# Patient Record
Sex: MALE | Race: WHITE | URBAN - METROPOLITAN AREA
[De-identification: names, ages, dates, MRNs, and addresses within clinical notes are randomized per-mention and may not be internally consistent; named-entity substitution may affect disease eponyms.]

---

## 2018-03-27 ENCOUNTER — OFFICE VISIT (OUTPATIENT)
Dept: FAMILY MEDICINE CLINIC | Age: 63
End: 2018-03-27

## 2018-03-27 VITALS
DIASTOLIC BLOOD PRESSURE: 71 MMHG | OXYGEN SATURATION: 98 % | RESPIRATION RATE: 16 BRPM | HEART RATE: 100 BPM | HEIGHT: 73 IN | SYSTOLIC BLOOD PRESSURE: 133 MMHG | WEIGHT: 315 LBS | BODY MASS INDEX: 41.75 KG/M2 | TEMPERATURE: 98.5 F

## 2018-03-27 DIAGNOSIS — J01.90 ACUTE SINUSITIS, RECURRENCE NOT SPECIFIED, UNSPECIFIED LOCATION: ICD-10-CM

## 2018-03-27 DIAGNOSIS — H66.90 ACUTE OTITIS MEDIA, UNSPECIFIED OTITIS MEDIA TYPE: ICD-10-CM

## 2018-03-27 DIAGNOSIS — J02.9 ACUTE PHARYNGITIS, UNSPECIFIED ETIOLOGY: Primary | ICD-10-CM

## 2018-03-27 LAB — S PYO AG THROAT QL: NORMAL

## 2018-03-27 PROCEDURE — 87880 STREP A ASSAY W/OPTIC: CPT | Performed by: FAMILY MEDICINE

## 2018-03-27 PROCEDURE — 99213 OFFICE O/P EST LOW 20 MIN: CPT | Performed by: FAMILY MEDICINE

## 2018-03-27 RX ORDER — EMPAGLIFLOZIN AND LINAGLIPTIN 25; 5 MG/1; MG/1
TABLET, FILM COATED ORAL
Refills: 3 | COMMUNITY
Start: 2018-03-16

## 2018-03-27 RX ORDER — AMLODIPINE AND VALSARTAN 5; 320 MG/1; MG/1
TABLET ORAL
Refills: 0 | COMMUNITY
Start: 2018-03-19

## 2018-03-27 RX ORDER — AMOXICILLIN AND CLAVULANATE POTASSIUM 875; 125 MG/1; MG/1
1 TABLET, FILM COATED ORAL 2 TIMES DAILY
Qty: 20 TABLET | Refills: 0 | Status: SHIPPED | OUTPATIENT
Start: 2018-03-27 | End: 2018-04-06

## 2018-03-27 RX ORDER — PRAVASTATIN SODIUM 20 MG
40 TABLET ORAL
COMMUNITY

## 2018-03-27 RX ORDER — METFORMIN HYDROCHLORIDE 500 MG/1
TABLET, EXTENDED RELEASE ORAL
Refills: 3 | COMMUNITY
Start: 2018-03-09

## 2018-03-27 RX ORDER — HYDROCHLOROTHIAZIDE 25 MG/1
TABLET ORAL
Refills: 0 | COMMUNITY
Start: 2018-03-16

## 2018-03-27 RX ORDER — PIOGLITAZONEHYDROCHLORIDE 30 MG/1
TABLET ORAL
Refills: 3 | COMMUNITY
Start: 2018-03-16

## 2018-03-27 RX ORDER — PRAVASTATIN SODIUM 20 MG
TABLET ORAL
Refills: 2 | COMMUNITY
Start: 2018-03-16

## 2018-03-27 RX ORDER — VALSARTAN 320 MG/1
320 TABLET ORAL
COMMUNITY
Start: 2013-02-26

## 2018-03-27 RX ORDER — TRIAMCINOLONE ACETONIDE 1 MG/G
CREAM TOPICAL
Refills: 2 | COMMUNITY
Start: 2018-01-25

## 2018-03-27 ASSESSMENT — ENCOUNTER SYMPTOMS
SINUS PAIN: 1
SHORTNESS OF BREATH: 0
VOMITING: 0
VISUAL CHANGE: 0
WHEEZING: 0
ABDOMINAL PAIN: 0
RHINORRHEA: 1
EYES NEGATIVE: 1
TROUBLE SWALLOWING: 1
SINUS PRESSURE: 1
VOICE CHANGE: 1
BLOOD IN STOOL: 0
SORE THROAT: 1
CHANGE IN BOWEL HABIT: 0
COUGH: 0
DIARRHEA: 0
CHEST TIGHTNESS: 0
BACK PAIN: 0
NAUSEA: 0

## 2018-03-27 NOTE — PATIENT INSTRUCTIONS
Patient Education        Ear Infection (Otitis Media): Care Instructions  Your Care Instructions    An ear infection may start with a cold and affect the middle ear (otitis media). It can hurt a lot. Most ear infections clear up on their own in a couple of days. Most often you will not need antibiotics. This is because many ear infections are caused by a virus. Antibiotics don't work against a virus. Regular doses of pain medicines are the best way to reduce your fever and help you feel better. Follow-up care is a key part of your treatment and safety. Be sure to make and go to all appointments, and call your doctor if you are having problems. It's also a good idea to know your test results and keep a list of the medicines you take. How can you care for yourself at home? · Take pain medicines exactly as directed. ¨ If the doctor gave you a prescription medicine for pain, take it as prescribed. ¨ If you are not taking a prescription pain medicine, take an over-the-counter medicine, such as acetaminophen (Tylenol), ibuprofen (Advil, Motrin), or naproxen (Aleve). Read and follow all instructions on the label. ¨ Do not take two or more pain medicines at the same time unless the doctor told you to. Many pain medicines have acetaminophen, which is Tylenol. Too much acetaminophen (Tylenol) can be harmful. · Plan to take a full dose of pain reliever before bedtime. Getting enough sleep will help you get better. · Try a warm, moist washcloth on the ear. It may help relieve pain. · If your doctor prescribed antibiotics, take them as directed. Do not stop taking them just because you feel better. You need to take the full course of antibiotics. When should you call for help? Call your doctor now or seek immediate medical care if:  ? · You have new or increasing ear pain. ? · You have new or increasing pus or blood draining from your ear. ? · You have a fever with a stiff neck or a severe headache. ? Watch doctor if you are having problems. It's also a good idea to keep a list of the medicines you take. Ask your doctor when you can expect to have your test results. Where can you learn more? Go to https://3SP Grouppelenard.Nordic Consumer Portals. org and sign in to your [employer] account. Enter B356 in the [facility] box to learn more about \"Rapid Strep Test: About This Test.\"     If you do not have an account, please click on the \"Sign Up Now\" link. Current as of: May 12, 2017  Content Version: 11.5  © 8374-6631 [employer]gennix. Care instructions adapted under license by [facility] ([facility]). If you have questions about a medical condition or this instruction, always ask your healthcare professional. [address] any warranty or liability for your use of this information. Patient Education        Saline Nasal Washes: Care Instructions  Your Care Instructions  Saline nasal washes help keep the nasal passages open by washing out thick or dried mucus. This simple remedy can help relieve symptoms of allergies, sinusitis, and colds. It also can make the nose feel more comfortable by keeping the mucous membranes moist. You may notice a little burning sensation in your nose the first few times you use the solution, but this usually gets better in a few days. Follow-up care is a key part of your treatment and safety. Be sure to make and go to all appointments, and call your doctor if you are having problems. It's also a good idea to know your test results and keep a list of the medicines you take. How can you care for yourself at home? · You can buy premixed saline solution in a squeeze bottle or other sinus rinse products at a drugstore. Read and follow the instructions on the label. · You also can make your own saline solution by adding 1 teaspoon of salt and 1 teaspoon of baking soda to 2 cups of distilled water. · If you use a homemade solution, pour a small amount into a clean bowl. Using a rubber bulb syringe, squeeze the syringe and place the tip in the salt water. Pull a small amount of the salt water into the syringe by relaxing your hand. · Sit down with your head tilted slightly back. Do not lie down. Put the tip of the bulb syringe or the squeeze bottle a little way into one of your nostrils. Gently drip or squirt a few drops into the nostril. Repeat with the other nostril. Some sneezing and gagging are normal at first.  · Gently blow your nose. · Wipe the syringe or bottle tip clean after each use. · Repeat this 2 or 3 times a day. · Use nasal washes gently if you have nosebleeds often. When should you call for help? Watch closely for changes in your health, and be sure to contact your doctor if:  ? · You often get nosebleeds. ? · You have problems doing the nasal washes. Where can you learn more? Go to https://TeachersMeet.com.DxUpClose. org and sign in to your fluIT Biosystems account. Enter [phone] in the [place] box to learn more about \"Saline Nasal Washes: Care Instructions. \"     If you do not have an account, please click on the \"Sign Up Now\" link. Current as of: May 12, 2017  Content Version: 11.5  © 3544-2065 Endgame. Care instructions adapted under license by [facility] ([facility]). If you have questions about a medical condition or this instruction, always ask your healthcare professional. [place] [address] any warranty or liability for your use of this information. Patient Education        Sinusitis: Care Instructions  Your Care Instructions    Sinusitis is an infection of the lining of the sinus cavities in your head. Sinusitis often follows a cold. It causes pain and pressure in your head and face. In most cases, sinusitis gets better on its own in 1 to 2 weeks. But some mild symptoms may last for several weeks. Sometimes antibiotics are needed. Follow-up care is a key part of your treatment and safety.  Be sure to make and as acetaminophen (Tylenol), ibuprofen (Advil, Motrin), or naproxen (Aleve). Read and follow all instructions on the label. · Be careful when taking over-the-counter cold or flu medicines and Tylenol at the same time. Many of these medicines have acetaminophen, which is Tylenol. Read the labels to make sure that you are not taking more than the recommended dose. Too much acetaminophen (Tylenol) can be harmful. · Drink plenty of fluids. Fluids may help soothe an irritated throat. Hot fluids, such as tea or soup, may help decrease throat pain. · Use over-the-counter throat lozenges to soothe pain. Regular cough drops or hard candy may also help. These should not be given to young children because of the risk of choking. · Do not smoke or allow others to smoke around you. If you need help quitting, talk to your doctor about stop-smoking programs and medicines. These can increase your chances of quitting for good. · Use a vaporizer or humidifier to add moisture to your bedroom. Follow the directions for cleaning the machine. When should you call for help? Call your doctor now or seek immediate medical care if:  ? · You have new or worse trouble swallowing. ? · Your sore throat gets much worse on one side. ? Watch closely for changes in your health, and be sure to contact your doctor if you do not get better as expected. Where can you learn more? Go to https://SNAPin Softwarepekeshaeweb.iAmplify. org and sign in to your MEDOP account. Enter Z011 in the [facility] box to learn more about \"Sore Throat: Care Instructions. \"     If you do not have an account, please click on the \"Sign Up Now\" link. Current as of: May 12, 2017  Content Version: 11.5  © 7858-6016 Healthwise, Novalact. Care instructions adapted under license by [facility] ([facility]).  If you have questions about a medical condition or this instruction, always ask your healthcare professional. Breanna Medina disclaims any warranty or

## 2018-03-27 NOTE — PROGRESS NOTES
(GLUCOPHAGE) 500 MG tablet Take 1,000 mg by mouth      metFORMIN (GLUCOPHAGE-XR) 500 MG extended release tablet TAKE TWO TABLET BY MOUTH TWO TIMES A DAY  3    pioglitazone (ACTOS) 30 MG tablet TK 1 T PO QD  3    pravastatin (PRAVACHOL) 20 MG tablet Take 40 mg by mouth       triamcinolone (KENALOG) 0.1 % cream   2    valsartan (DIOVAN) 320 MG tablet Take 320 mg by mouth      amoxicillin-clavulanate (AUGMENTIN) 875-125 MG per tablet Take 1 tablet by mouth 2 times daily for 10 days 20 tablet 0    pravastatin (PRAVACHOL) 20 MG tablet TK 1 T PO QD  2     No current facility-administered medications for this visit. Allergies   Allergen Reactions    Ciprofloxacin Diarrhea    Celecoxib Nausea And Vomiting       Health Maintenance   Topic Date Due    Potassium monitoring  1955    Creatinine monitoring  1955    Hepatitis C screen  1955    HIV screen  08/27/1970    DTaP/Tdap/Td vaccine (1 - Tdap) 08/27/1974    Lipid screen  08/27/1995    Diabetes screen  08/27/1995    Shingles Vaccine (1 of 2 - 2 Dose Series) 08/27/2005    Colon cancer screen colonoscopy  08/27/2005    Flu vaccine (1) 09/01/2017       Subjective:      Review of Systems   Constitutional: Positive for fatigue. Negative for appetite change, chills, diaphoresis and fever. HENT: Positive for congestion, postnasal drip, rhinorrhea, sinus pain, sinus pressure, sneezing, sore throat, trouble swallowing and voice change. Negative for ear discharge, ear pain and tinnitus. Eyes: Negative. Respiratory: Negative for cough, chest tightness, shortness of breath and wheezing. Cardiovascular: Negative for chest pain and palpitations. Gastrointestinal: Negative for abdominal pain, blood in stool, change in bowel habit, diarrhea, nausea and vomiting. Genitourinary: Negative for dysuria, frequency, hematuria and urgency. Musculoskeletal: Negative for back pain, myalgias and neck pain. Skin: Negative for rash.

## 2023-10-03 ENCOUNTER — PHARMACY VISIT (OUTPATIENT)
Dept: PHARMACY | Facility: CLINIC | Age: 68
End: 2023-10-03
Payer: MEDICARE

## 2023-10-03 PROCEDURE — RXMED WILLOW AMBULATORY MEDICATION CHARGE

## 2023-10-03 RX ORDER — METFORMIN HYDROCHLORIDE 500 MG/1
1000 TABLET, EXTENDED RELEASE ORAL 2 TIMES DAILY
Qty: 60 TABLET | Refills: 1 | OUTPATIENT
Start: 2023-10-03 | End: 2023-10-10

## 2023-10-03 RX ORDER — INSULIN GLARGINE 300 U/ML
INJECTION, SOLUTION SUBCUTANEOUS
Qty: 3 ML | Refills: 2 | OUTPATIENT
Start: 2023-10-03 | End: 2024-02-01

## 2023-10-03 RX ORDER — ALLOPURINOL 100 MG/1
100 TABLET ORAL
Qty: 30 TABLET | Refills: 0 | OUTPATIENT
Start: 2023-10-03 | End: 2023-10-10

## 2023-10-03 RX ORDER — HYDROCHLOROTHIAZIDE 25 MG/1
TABLET ORAL
Qty: 90 TABLET | Refills: 0 | OUTPATIENT
Start: 2023-10-03 | End: 2023-10-04

## 2023-10-03 RX ORDER — TAMSULOSIN HYDROCHLORIDE 0.4 MG/1
0.4 CAPSULE ORAL DAILY
Qty: 30 CAPSULE | Refills: 1 | OUTPATIENT
Start: 2023-10-03 | End: 2023-10-10

## 2023-10-06 ENCOUNTER — PHARMACY VISIT (OUTPATIENT)
Dept: PHARMACY | Facility: CLINIC | Age: 68
End: 2023-10-06
Payer: MEDICARE

## 2023-10-06 PROCEDURE — RXMED WILLOW AMBULATORY MEDICATION CHARGE

## 2023-10-06 RX ORDER — AMLODIPINE AND VALSARTAN 5; 320 MG/1; MG/1
1 TABLET ORAL
Qty: 30 TABLET | Refills: 2 | OUTPATIENT
Start: 2023-10-06

## 2023-10-06 RX ORDER — AMLODIPINE AND VALSARTAN 5; 320 MG/1; MG/1
1 TABLET ORAL
Qty: 30 TABLET | Refills: 2 | OUTPATIENT
Start: 2023-10-06 | End: 2023-10-10

## 2023-10-06 RX ORDER — AMLODIPINE AND VALSARTAN 5; 320 MG/1; MG/1
1 TABLET ORAL DAILY
Qty: 90 TABLET | Refills: 0 | OUTPATIENT
Start: 2023-10-06

## 2023-10-10 ENCOUNTER — PHARMACY VISIT (OUTPATIENT)
Dept: PHARMACY | Facility: CLINIC | Age: 68
End: 2023-10-10
Payer: MEDICARE

## 2023-11-10 ENCOUNTER — PHARMACY VISIT (OUTPATIENT)
Dept: PHARMACY | Facility: CLINIC | Age: 68
End: 2023-11-10
Payer: MEDICARE

## 2023-11-10 PROCEDURE — RXMED WILLOW AMBULATORY MEDICATION CHARGE

## 2023-11-10 RX ORDER — SEMAGLUTIDE 2.68 MG/ML
INJECTION, SOLUTION SUBCUTANEOUS
Qty: 12 ML | Refills: 0 | Status: CANCELLED | OUTPATIENT
Start: 2023-11-10 | End: 2024-11-09

## 2023-11-20 ENCOUNTER — PHARMACY VISIT (OUTPATIENT)
Dept: PHARMACY | Facility: CLINIC | Age: 68
End: 2023-11-20
Payer: MEDICARE

## 2023-11-20 PROCEDURE — RXMED WILLOW AMBULATORY MEDICATION CHARGE

## 2023-12-01 ENCOUNTER — PHARMACY VISIT (OUTPATIENT)
Dept: PHARMACY | Facility: CLINIC | Age: 68
End: 2023-12-01
Payer: MEDICARE

## 2023-12-01 PROCEDURE — RXMED WILLOW AMBULATORY MEDICATION CHARGE

## 2024-02-27 ENCOUNTER — APPOINTMENT (OUTPATIENT)
Dept: OTOLARYNGOLOGY | Facility: CLINIC | Age: 69
End: 2024-02-27
Payer: MEDICARE

## 2024-04-09 ENCOUNTER — APPOINTMENT (OUTPATIENT)
Dept: OTOLARYNGOLOGY | Facility: CLINIC | Age: 69
End: 2024-04-09
Payer: MEDICARE

## 2024-09-30 ENCOUNTER — TELEPHONE (OUTPATIENT)
Dept: ENDOCRINOLOGY | Facility: CLINIC | Age: 69
End: 2024-09-30
Payer: MEDICARE

## 2024-09-30 NOTE — TELEPHONE ENCOUNTER
Called to see if a referral had be received yet so he could schedule. I told him that nothing has been faxed or put in [facility] as of yet.

## 2024-10-01 ENCOUNTER — TELEPHONE (OUTPATIENT)
Dept: ENDOCRINOLOGY | Facility: CLINIC | Age: 69
End: 2024-10-01
Payer: MEDICARE

## 2024-10-01 NOTE — TELEPHONE ENCOUNTER
Called Mckinley and left a message letting him know we have finally received his referral and can schedule him.

## 2024-11-01 PROCEDURE — RXMED WILLOW AMBULATORY MEDICATION CHARGE

## 2024-11-02 ENCOUNTER — PHARMACY VISIT (OUTPATIENT)
Dept: PHARMACY | Facility: CLINIC | Age: 69
End: 2024-11-02
Payer: COMMERCIAL

## 2024-11-21 PROCEDURE — RXMED WILLOW AMBULATORY MEDICATION CHARGE

## 2024-11-27 ENCOUNTER — PHARMACY VISIT (OUTPATIENT)
Dept: PHARMACY | Facility: CLINIC | Age: 69
End: 2024-11-27
Payer: COMMERCIAL

## 2024-12-09 PROCEDURE — RXMED WILLOW AMBULATORY MEDICATION CHARGE

## 2024-12-16 ENCOUNTER — PHARMACY VISIT (OUTPATIENT)
Dept: PHARMACY | Facility: CLINIC | Age: 69
End: 2024-12-16
Payer: COMMERCIAL

## 2025-01-13 ENCOUNTER — PHARMACY VISIT (OUTPATIENT)
Dept: PHARMACY | Facility: CLINIC | Age: 70
End: 2025-01-13
Payer: COMMERCIAL

## 2025-01-13 PROCEDURE — RXMED WILLOW AMBULATORY MEDICATION CHARGE

## 2025-01-13 PROCEDURE — RXOTC WILLOW AMBULATORY OTC CHARGE

## 2025-01-22 NOTE — PROGRESS NOTES
HPI   68 yo presents as new pt for metabolic management.  Pt with dm2,(dx in 30's), htn, dyslipidemia, cvd (per cardiac cath), fatty liver disease, teena, highest wt 372 lbs, lowest wt 307 lbs on ozempic. A1c-8.4% today.    Testing sugars 1-3 times per day, no low sugars, am sugars mid 100's, middle of day low/mid 100's.  Pt is exercising on a regular basis.    Taking mounjaro 7.5mg weekly, metformin 1gram bid, toujeo 64 units at bedtime  -did not tolerate jardiance  -had tried ozempic in the past    -had problems keeping tc on    Taking amlodipine 5mg/valsartan 320mg every day.    -taking pravastatin 40mg at bedtime    -seeing cardiology (cecil)    [facility] labs:  11/24-A1c-10.4%  10/24 cr-1.09  5/23 ldl-130, A1c-7%    PAST MEDICAL HISTORY   Diagnosis Date   Benign neoplasm of duodenum, jejunum, and ileum   H/O carcinoid tumor   Displacement of cervical intervertebral disc without myelopathy   DM (diabetes mellitus) (HCC)   Esophageal reflux   HTN (hypertension)   Mixed hyperlipidemia   Other anxiety states   Unspecified sinusitis (chronic)   Unspecified sleep apnea     PAST SURGICAL HISTORY   Procedure Laterality Date   APPENDECTOMY   ARTHROTOMY W/MENISCUS REPAIR KNEE   Open bilat knee reconstruction   ENTRC RESCJ SMALL INTESTINE 1 RESCJ & ANAST   Resection, partial sm bowel   EXPLORATORY LAPAROTOMY CELIOTOMY W/WO BIOPSY SPX   Laparotomy, exp   LEFT HEART CATH,PERCUTANEOUS 1997   per outside report cx-40%/distal RCA 70%/LAD 50%   PALATE/UVULA SURGERY UNLISTED   uvulopalatopharyngoplasty with tonsillectomy   PAST SURGICAL HISTORY OF 2/25/08   cervical spine instrumentation   PAST SURGICAL HISTORY OF 2004   ventral hernia repair   PAST SURGICAL HISTORY OF 1980s   bl knees medial miniscus repair   RPR 1ST INGUN HRNA AGE 5 YRS/> REDUCIBLE   bilateral   RPR EPIGASTRIC HERNIA REDUCIBLE SPX   SPHINCTEROTOMY ANAL DIVISION SPHINCTER SPX     FAMILY HISTORY   Problem Relation Age of Onset   Coronary Artery Disease Maternal  "Grandfather   Coronary Artery Disease Maternal Grandmother   Heart Mother   valve disease   Cancer Mother   Diabetes Mother   Hypertension Mother   Cancer Father     Social History   -hx of tobacco use, not currently, + alcohol use      Current Outpatient Medications:     allopurinol (Zyloprim) 100 mg tablet, Take 1 tablet (100 mg) by mouth once daily., Disp: , Rfl:     amlodipine-valsartan (Exforge) 5-320 mg tablet, Take 1 tablet by mouth in the morning., Disp: 30 tablet, Rfl: 2    BD Ultra-Fine Short Pen Needle 31 gauge x 5/16\" needle, USE TO INJECT 60 UNITS DAILY, Disp: , Rfl:     metFORMIN  mg 24 hr tablet, Take 2 tablets (1,000 mg) by mouth 2 times a day., Disp: , Rfl:     tamsulosin (Flomax) 0.4 mg 24 hr capsule, take two capsules by mouth daily at bedtime, Disp: , Rfl:     tirzepatide (Mounjaro) 10 mg/0.5 mL pen injector, Inject 10 mg subcutaneously one time a week., Disp: 2 mL, Rfl: 5    Toujeo Max U-300 SoloStar 300 unit/mL (3 mL) injection, INJECT 38 UNITS SUBCUTANEOUSLY DAILY, Disp: 6 mL, Rfl: 2    amlodipine-valsartan (Exforge) 5-320 mg tablet, Take 1 tablet by mouth once daily. (Patient not taking: Reported on 1/23/2025), Disp: 90 tablet, Rfl: 0    semaglutide (Ozempic) 1 mg/dose (4 mg/3 mL) pen injector, Inject 1 mg under the skin 1 (one) time per week (Patient not taking: Reported on 1/23/2025), Disp: 3 mL, Rfl: 2    semaglutide 2 mg/dose (8 mg/3 mL) pen injector, INJECT 2 MG UNDER THE SKIN ONCE WEEKLY AS DIRECTED, Disp: 12 mL, Rfl: 0      Allergies as of 01/23/2025 - Reviewed 01/23/2025   Allergen Reaction Noted    Empagliflozin Itching and Dry mouth 10/07/2024    Celecoxib GI intolerance, GI Upset, Nausea/vomiting, Nausea And Vomiting, and Unknown 10/24/2011    Ciprofloxacin Diarrhea and Rash 03/27/2018         Review of Systems   Cardiology: Lightheadedness-denies.  Chest pain-denies.  Leg edema-denies.  Palpitations-denies.  Respiratory: Cough-denies. Shortness of breath-denies.  " "Wheezing-denies.  Gastroenterology: Constipation-denies.  Diarrhea-denies.  Heartburn-denies.  Endocrinology: Cold intolerance-denies.  Heat intolerance-denies.  Sweats-denies.  Neurology: Headache-denies.  Tremor-denies.  Neuropathy in extremities-denies.  Psychology: Low energy-denies.  Irritability-denies.  Sleep disturbances-denies.      /61   Pulse 80   Ht 1.88 m (6' 2\")   Wt (!) 150 kg (331 lb)   BMI 42.50 kg/m²       Labs:  Lab Results   Component Value Date    WBC 6.3 05/10/2023    NRBC 0 05/10/2023    RBC 4.50 05/10/2023    HGB 13.2 (L) 05/10/2023    HCT 41.7 05/10/2023     05/10/2023     Lab Results   Component Value Date    CALCIUM 9.3 05/10/2023    AST 38 05/10/2023    ALKPHOS 100 05/10/2023    BILITOT 0.3 05/10/2023    PROT 7.2 05/10/2023    ALBUMIN 4.0 05/10/2023    GLOB 3.2 05/10/2023    AGR 1.3 (L) 05/10/2023     05/10/2023    K 4.7 05/10/2023     05/10/2023    CO2 22 (L) 05/10/2023    ANIONGAP 14 05/10/2023    BUN 23 05/10/2023    CREATININE 0.9 05/10/2023    UREACREAUR 25.6 (H) 05/10/2023    GLUCOSE 146 (H) 05/10/2023    ALT 38 05/10/2023    EGFR 94 05/10/2023     Lab Results   Component Value Date    CHOL 162 05/10/2023    TRIG 130 05/10/2023    HDL 32 (L) 05/10/2023    LDLCALC 130 05/10/2023     Lab Results   Component Value Date    MICROALBCREA 11.7 02/11/2019       Lab Results   Component Value Date    HGBA1C 8.4 (A) 01/23/2025         Physical Exam   General Appearance: pleasant, cooperative, no acute distress  HEENT: no chemosis, no proptosis, no lid lag, no lid retraction  Neck: no lymphadenopathy, no thyromegaly, no dominant thyroid nodules  Heart: no murmurs, regular rate and rhythm, S1 and S2  Lungs: no wheezes, no rhonci, no rales  Extremities: no lower extremity swelling      Assessment/Plan   1. Type 2 diabetes mellitus without complication, without long-term current use of insulin (Multi) (Primary)  -A1c ordered and reviewed  -glycemic values " reviewed  -labs reviewed  -previous notes reviewed    -reviewed carb control/glycemic targets  -increase mounjaro 12.5mg/15mg, pt feels ozempic worked better/option to transition back to  -add pioglitazone 15mg, stop if swelling  -lower insulin in 5 unit intervals if am sugars <100    2. Primary hypertension  -at target on arb, will follow     3. Dyslipidemia  -would target ldl<55 given dm2/cvd    4. Fatty liver disease  -glp-1RA, wt loss, lifestyle    Follow Up:  pharmD 3 months    Medical Decision Making  Complexity of problem: Chronic illness of diabetes mellitus uncontrolled, progressing  Data analyzed and reviewed: Reviewed prior notes, blood glucose data, labs including HgbA1c, lipids, serum chemistries.  Ordered tests.   Risk of complications and morbidities: Is definite because of use of insulin and risk of hypoglycemia.  Prescription medications reviewed and modifications made.  Compliance assessed.  Addressed social determinants of health including food insecurity.

## 2025-01-23 ENCOUNTER — APPOINTMENT (OUTPATIENT)
Dept: ENDOCRINOLOGY | Facility: CLINIC | Age: 70
End: 2025-01-23
Payer: MEDICARE

## 2025-01-23 VITALS
HEIGHT: 74 IN | BODY MASS INDEX: 40.43 KG/M2 | SYSTOLIC BLOOD PRESSURE: 125 MMHG | HEART RATE: 80 BPM | DIASTOLIC BLOOD PRESSURE: 61 MMHG | WEIGHT: 315 LBS

## 2025-01-23 DIAGNOSIS — I10 PRIMARY HYPERTENSION: ICD-10-CM

## 2025-01-23 DIAGNOSIS — E11.9 TYPE 2 DIABETES MELLITUS WITHOUT COMPLICATION, WITHOUT LONG-TERM CURRENT USE OF INSULIN (MULTI): Primary | ICD-10-CM

## 2025-01-23 DIAGNOSIS — E78.2 MIXED HYPERLIPIDEMIA: ICD-10-CM

## 2025-01-23 LAB — POC HEMOGLOBIN A1C: 8.4 % (ref 4.2–6.5)

## 2025-01-23 PROCEDURE — 1036F TOBACCO NON-USER: CPT | Performed by: INTERNAL MEDICINE

## 2025-01-23 PROCEDURE — 1159F MED LIST DOCD IN RCRD: CPT | Performed by: INTERNAL MEDICINE

## 2025-01-23 PROCEDURE — 1126F AMNT PAIN NOTED NONE PRSNT: CPT | Performed by: INTERNAL MEDICINE

## 2025-01-23 PROCEDURE — 3078F DIAST BP <80 MM HG: CPT | Performed by: INTERNAL MEDICINE

## 2025-01-23 PROCEDURE — 99204 OFFICE O/P NEW MOD 45 MIN: CPT | Performed by: INTERNAL MEDICINE

## 2025-01-23 PROCEDURE — 3008F BODY MASS INDEX DOCD: CPT | Performed by: INTERNAL MEDICINE

## 2025-01-23 PROCEDURE — 83036 HEMOGLOBIN GLYCOSYLATED A1C: CPT | Performed by: INTERNAL MEDICINE

## 2025-01-23 PROCEDURE — 3074F SYST BP LT 130 MM HG: CPT | Performed by: INTERNAL MEDICINE

## 2025-01-23 RX ORDER — METFORMIN HYDROCHLORIDE 500 MG/1
1000 TABLET, EXTENDED RELEASE ORAL 2 TIMES DAILY
COMMUNITY
End: 2025-01-23 | Stop reason: SDUPTHER

## 2025-01-23 RX ORDER — AMLODIPINE AND VALSARTAN 5; 320 MG/1; MG/1
1 TABLET ORAL
Qty: 90 TABLET | Refills: 1 | Status: SHIPPED | OUTPATIENT
Start: 2025-01-23

## 2025-01-23 RX ORDER — PRAVASTATIN SODIUM 40 MG/1
40 TABLET ORAL DAILY
Qty: 90 TABLET | Refills: 1 | Status: SHIPPED | OUTPATIENT
Start: 2025-01-23 | End: 2026-01-23

## 2025-01-23 RX ORDER — PIOGLITAZONEHYDROCHLORIDE 15 MG/1
15 TABLET ORAL DAILY
Qty: 90 TABLET | Refills: 1 | Status: SHIPPED | OUTPATIENT
Start: 2025-01-23 | End: 2026-01-23

## 2025-01-23 RX ORDER — METFORMIN HYDROCHLORIDE 500 MG/1
TABLET, EXTENDED RELEASE ORAL
Qty: 360 TABLET | Refills: 1 | Status: SHIPPED | OUTPATIENT
Start: 2025-01-23

## 2025-01-23 RX ORDER — TIRZEPATIDE 15 MG/.5ML
15 INJECTION, SOLUTION SUBCUTANEOUS
Qty: 2 ML | Refills: 5 | Status: SHIPPED | OUTPATIENT
Start: 2025-01-26

## 2025-01-23 RX ORDER — INSULIN GLARGINE 100 [IU]/ML
INJECTION, SOLUTION SUBCUTANEOUS
Qty: 60 ML | Refills: 1 | Status: SHIPPED | OUTPATIENT
Start: 2025-01-23

## 2025-01-23 RX ORDER — ALLOPURINOL 100 MG/1
100 TABLET ORAL DAILY
COMMUNITY

## 2025-01-23 RX ORDER — TAMSULOSIN HYDROCHLORIDE 0.4 MG/1
CAPSULE ORAL
COMMUNITY

## 2025-01-23 RX ORDER — TIRZEPATIDE 12.5 MG/.5ML
12.5 INJECTION, SOLUTION SUBCUTANEOUS
Qty: 2 ML | Refills: 0 | Status: SHIPPED | OUTPATIENT
Start: 2025-01-26

## 2025-01-23 RX ORDER — PEN NEEDLE, DIABETIC 31 GX5/16"
NEEDLE, DISPOSABLE MISCELLANEOUS
COMMUNITY
Start: 2024-12-21

## 2025-01-23 ASSESSMENT — LIFESTYLE VARIABLES
HOW OFTEN DO YOU HAVE A DRINK CONTAINING ALCOHOL: NEVER
AUDIT-C TOTAL SCORE: 0
HOW OFTEN DO YOU HAVE SIX OR MORE DRINKS ON ONE OCCASION: NEVER
HOW MANY STANDARD DRINKS CONTAINING ALCOHOL DO YOU HAVE ON A TYPICAL DAY: PATIENT DOES NOT DRINK
SKIP TO QUESTIONS 9-10: 1

## 2025-01-23 ASSESSMENT — ENCOUNTER SYMPTOMS
LOSS OF SENSATION IN FEET: 0
DEPRESSION: 0
OCCASIONAL FEELINGS OF UNSTEADINESS: 0

## 2025-01-23 ASSESSMENT — PATIENT HEALTH QUESTIONNAIRE - PHQ9
1. LITTLE INTEREST OR PLEASURE IN DOING THINGS: NOT AT ALL
SUM OF ALL RESPONSES TO PHQ9 QUESTIONS 1 & 2: 0
2. FEELING DOWN, DEPRESSED OR HOPELESS: NOT AT ALL

## 2025-01-23 ASSESSMENT — PAIN SCALES - GENERAL: PAINLEVEL_OUTOF10: 0-NO PAIN

## 2025-02-06 PROCEDURE — RXMED WILLOW AMBULATORY MEDICATION CHARGE

## 2025-02-10 ENCOUNTER — OFFICE VISIT (OUTPATIENT)
Dept: OTOLARYNGOLOGY | Facility: CLINIC | Age: 70
End: 2025-02-10
Payer: MEDICARE

## 2025-02-10 VITALS — HEIGHT: 74 IN | BODY MASS INDEX: 40.43 KG/M2 | WEIGHT: 315 LBS

## 2025-02-10 DIAGNOSIS — G47.33 OBSTRUCTIVE SLEEP APNEA: ICD-10-CM

## 2025-02-10 DIAGNOSIS — R42 VERTIGO: Primary | ICD-10-CM

## 2025-02-10 PROCEDURE — 1159F MED LIST DOCD IN RCRD: CPT | Performed by: OTOLARYNGOLOGY

## 2025-02-10 PROCEDURE — 1036F TOBACCO NON-USER: CPT | Performed by: OTOLARYNGOLOGY

## 2025-02-10 PROCEDURE — 3008F BODY MASS INDEX DOCD: CPT | Performed by: OTOLARYNGOLOGY

## 2025-02-10 PROCEDURE — 99213 OFFICE O/P EST LOW 20 MIN: CPT | Performed by: OTOLARYNGOLOGY

## 2025-02-10 NOTE — PROGRESS NOTES
"Chief Complaint   Patient presents with    Follow-up     EP 10/2022- DIZZINESS       HPI:  Mckinley Meade is a 69 y.o. male who when getting out of bed this morning had some quick vertigo.  Feels a little bit off balance with walking but has not had any further issues.  Denies hearing trouble, tinnitus, pressure, pain.  History of benign positional vertigo in the past.  Denies any focal or global neurologic changes.  H/O obstructive sleep apnea. Originally documented in 1999 with an RDI of 73. Retested in 2002 with an AHI of 21. Currently on CPAP autotherapy from 7 to 15 cm of water.   History of tonsillectomy as well septoplasty and turbinate reduction.    PMH:  Past Medical History:   Diagnosis Date    Personal history of other diseases of the circulatory system     History of hypertension    Personal history of other diseases of the nervous system and sense organs     History of obstructive sleep apnea     Past Surgical History:   Procedure Laterality Date    OTHER SURGICAL HISTORY  10/17/2022    Neck surgery    OTHER SURGICAL HISTORY  10/17/2022    Knee surgery    OTHER SURGICAL HISTORY  10/17/2022    Nasal septoplasty         Medications:     Current Outpatient Medications:     allopurinol (Zyloprim) 100 mg tablet, Take 1 tablet (100 mg) by mouth once daily., Disp: , Rfl:     amlodipine-valsartan (Exforge) 5-320 mg tablet, Take 1 tablet by mouth in the morning., Disp: 90 tablet, Rfl: 1    BD Ultra-Fine Short Pen Needle 31 gauge x 5/16\" needle, USE TO INJECT 60 UNITS DAILY, Disp: , Rfl:     insulin glargine (Lantus) 100 unit/mL (3 mL) pen, 64 units dailyTake as directed per insulin instructions., Disp: 60 mL, Rfl: 1    metFORMIN  mg 24 hr tablet, 2 pills po bid, Disp: 360 tablet, Rfl: 1    pioglitazone (Actos) 15 mg tablet, Take 1 tablet (15 mg) by mouth once daily., Disp: 90 tablet, Rfl: 1    pravastatin (Pravachol) 40 mg tablet, Take 1 tablet (40 mg) by mouth once daily., Disp: 90 tablet, Rfl: 1    " "semaglutide (Ozempic) 1 mg/dose (4 mg/3 mL) pen injector, Inject 1 mg under the skin 1 (one) time per week, Disp: 3 mL, Rfl: 2    tamsulosin (Flomax) 0.4 mg 24 hr capsule, take two capsules by mouth daily at bedtime, Disp: , Rfl:     tirzepatide (Mounjaro) 10 mg/0.5 mL pen injector, Inject 10 mg subcutaneously one time a week., Disp: 2 mL, Rfl: 5    tirzepatide (Mounjaro) 12.5 mg/0.5 mL pen injector, Inject 12.5 mg under the skin 1 (one) time per week., Disp: 2 mL, Rfl: 0    tirzepatide (Mounjaro) 15 mg/0.5 mL pen injector, Inject 15 mg under the skin 1 (one) time per week., Disp: 2 mL, Rfl: 5    semaglutide 2 mg/dose (8 mg/3 mL) pen injector, INJECT 2 MG UNDER THE SKIN ONCE WEEKLY AS DIRECTED, Disp: 12 mL, Rfl: 0    Toujeo Max U-300 SoloStar 300 unit/mL (3 mL) injection, INJECT 38 UNITS SUBCUTANEOUSLY DAILY, Disp: 6 mL, Rfl: 2     Allergies:  Allergies   Allergen Reactions    Empagliflozin Itching and Dry mouth     Thrush    Celecoxib GI intolerance, GI Upset, Nausea/vomiting, Nausea And Vomiting and Unknown    Ciprofloxacin Diarrhea and Rash        ROS:  Review of systems normal unless stated otherwise in the HPI and/or PMH.    Physical Exam:  Height 1.88 m (6' 2\"), weight 150 kg (330 lb). Body mass index is 42.37 kg/m².     GENERAL APPEARANCE: Well developed and well nourished.  Alert and oriented in no acute distress.  Normal vocal quality.      HEAD/FACE: No erythema or edema or facial tenderness.  Normal facial nerve function bilaterally.    EAR:       EXTERNAL: Normal pinnas and external auditory canals without lesion or obstructing wax.       MIDDLE EAR: Tympanic membranes intact and mobile with normal landmarks.  Middle ear space appears well aerated.       TUBE STATUS: N/A       MASTOID CAVITY: N/A       HEARING: Gross hearing assessment is within normal limits.  Normal bilateral [place]-Hallpike      NOSE:       VISUALIZED USING: Anterior rhinoscopy with headlight and nasal speculum.       DORSUM: Midline, " nontraumatic appearance.       MUCOSA: Normal-appearing.       SECRETIONS: Normal.       SEPTUM: Midline and nonobstructing.       INFERIOR TURBINATES: Normal.       MIDDLE TURBINATES/MEATUS: N/A       BLEEDING: N/A         ORAL CAVITY/PHARYNX:       TEETH: Adequate dentition.       TONGUE: No mass or lesion.  Normal mobility.       FLOOR OF MOUTH: No mass or lesion.       PALATE: Normal hard palate, soft palate, and uvula.       OROPHARYNX: Normal without mass or lesion.       BUCCAL MUCOSA/GBS: Normal without mass or lesion.       LIPS: Normal.    LARYNX/HYPOPHARYNX/NASOPHARYNX: N/A    NECK: No palpable masses or abnormal adenopathy.  Trachea is midline.    THYROID: No thyromegaly or palpable nodule.    SALIVARY GLANDS: Normal bilateral parotid and submandibular glands by inspection and palpation.    TMJ's: Normal.    NEURO: Cranial nerve exam grossly normal bilaterally.       Assessment/Plan   Mckinley was seen today for follow-up.  Diagnoses and all orders for this visit:  Vertigo (Primary)  Obstructive sleep apnea     Have some mild vertigo this morning.  Not really recreated but feels a little bit off balance.  No neurologic symptoms.  Negative [place]-Hallpike today.  No other changes.  May have been some vestibular neuronitis.  Having some neck pain which could be causing some positional dizziness as well.  Will keep a close eye on things and he will call me over the next day or 2 with updates.  If he gets worse at all or develops new symptoms he will call or go to the emergency department  Follow up if symptoms worsen or fail to improve.     García Alejandro MD

## 2025-02-11 ENCOUNTER — PHARMACY VISIT (OUTPATIENT)
Dept: PHARMACY | Facility: CLINIC | Age: 70
End: 2025-02-11
Payer: COMMERCIAL

## 2025-03-10 ENCOUNTER — TELEPHONE (OUTPATIENT)
Dept: ENDOCRINOLOGY | Facility: CLINIC | Age: 70
End: 2025-03-10
Payer: MEDICARE

## 2025-03-10 PROCEDURE — RXMED WILLOW AMBULATORY MEDICATION CHARGE

## 2025-03-10 NOTE — TELEPHONE ENCOUNTER
Pt states that he is not losing wt on Mounjaro, it helps his BS but he has been gaining wt. He would like to know if he can go back on Ozempic?

## 2025-03-13 ENCOUNTER — PHARMACY VISIT (OUTPATIENT)
Dept: PHARMACY | Facility: CLINIC | Age: 70
End: 2025-03-13
Payer: COMMERCIAL

## 2025-04-05 PROCEDURE — RXMED WILLOW AMBULATORY MEDICATION CHARGE

## 2025-04-10 ENCOUNTER — PHARMACY VISIT (OUTPATIENT)
Dept: PHARMACY | Facility: CLINIC | Age: 70
End: 2025-04-10
Payer: COMMERCIAL

## 2025-04-24 ENCOUNTER — APPOINTMENT (OUTPATIENT)
Dept: ENDOCRINOLOGY | Facility: CLINIC | Age: 70
End: 2025-04-24
Payer: MEDICARE

## 2025-04-24 ENCOUNTER — TELEPHONE (OUTPATIENT)
Dept: ENDOCRINOLOGY | Facility: CLINIC | Age: 70
End: 2025-04-24

## 2025-04-24 DIAGNOSIS — E11.9 TYPE 2 DIABETES MELLITUS WITHOUT COMPLICATION, WITHOUT LONG-TERM CURRENT USE OF INSULIN: ICD-10-CM

## 2025-04-24 RX ORDER — INSULIN DEGLUDEC 100 U/ML
INJECTION, SOLUTION SUBCUTANEOUS
Qty: 60 ML | Refills: 1 | Status: SHIPPED | OUTPATIENT
Start: 2025-04-24

## 2025-05-08 DIAGNOSIS — E11.9 TYPE 2 DIABETES MELLITUS WITHOUT COMPLICATION, WITHOUT LONG-TERM CURRENT USE OF INSULIN: Primary | ICD-10-CM

## 2025-05-08 PROCEDURE — RXMED WILLOW AMBULATORY MEDICATION CHARGE

## 2025-05-08 RX ORDER — ALLOPURINOL 100 MG/1
TABLET ORAL
Qty: 90 TABLET | Refills: 1 | Status: SHIPPED | OUTPATIENT
Start: 2025-05-08

## 2025-05-12 ENCOUNTER — PHARMACY VISIT (OUTPATIENT)
Dept: PHARMACY | Facility: CLINIC | Age: 70
End: 2025-05-12
Payer: COMMERCIAL

## 2025-05-29 ENCOUNTER — TELEMEDICINE (OUTPATIENT)
Dept: PHARMACY | Facility: HOSPITAL | Age: 70
End: 2025-05-29
Payer: MEDICARE

## 2025-05-29 ENCOUNTER — APPOINTMENT (OUTPATIENT)
Dept: ENDOCRINOLOGY | Facility: CLINIC | Age: 70
End: 2025-05-29
Payer: MEDICARE

## 2025-05-29 VITALS — HEART RATE: 70 BPM | DIASTOLIC BLOOD PRESSURE: 83 MMHG | SYSTOLIC BLOOD PRESSURE: 140 MMHG

## 2025-05-29 DIAGNOSIS — E11.9 TYPE 2 DIABETES MELLITUS WITHOUT COMPLICATION, WITHOUT LONG-TERM CURRENT USE OF INSULIN: Primary | ICD-10-CM

## 2025-05-29 DIAGNOSIS — I10 PRIMARY HYPERTENSION: ICD-10-CM

## 2025-05-29 DIAGNOSIS — E11.9 TYPE 2 DIABETES MELLITUS WITHOUT COMPLICATION, WITHOUT LONG-TERM CURRENT USE OF INSULIN: ICD-10-CM

## 2025-05-29 DIAGNOSIS — E78.2 MIXED HYPERLIPIDEMIA: ICD-10-CM

## 2025-05-29 LAB — POC HEMOGLOBIN A1C: 7 % (ref 4.2–6.5)

## 2025-05-29 PROCEDURE — 99214 OFFICE O/P EST MOD 30 MIN: CPT | Performed by: INTERNAL MEDICINE

## 2025-05-29 PROCEDURE — 99214 OFFICE O/P EST MOD 30 MIN: CPT | Performed by: PHARMACIST

## 2025-05-29 PROCEDURE — 83036 HEMOGLOBIN GLYCOSYLATED A1C: CPT | Performed by: PHARMACIST

## 2025-05-29 RX ORDER — PRAVASTATIN SODIUM 40 MG/1
40 TABLET ORAL DAILY
Qty: 90 TABLET | Refills: 1 | Status: SHIPPED | OUTPATIENT
Start: 2025-05-29

## 2025-05-29 RX ORDER — ALLOPURINOL 100 MG/1
100 TABLET ORAL DAILY
Qty: 90 TABLET | Refills: 1 | Status: SHIPPED | OUTPATIENT
Start: 2025-05-29

## 2025-05-29 RX ORDER — PEN NEEDLE, DIABETIC 31 GX5/16"
NEEDLE, DISPOSABLE MISCELLANEOUS
Qty: 100 EACH | Refills: 1 | Status: SHIPPED | OUTPATIENT
Start: 2025-05-29

## 2025-05-29 RX ORDER — INSULIN GLARGINE 100 [IU]/ML
INJECTION, SOLUTION SUBCUTANEOUS
Qty: 30 ML | Refills: 1 | Status: CANCELLED | OUTPATIENT
Start: 2025-05-29

## 2025-05-29 RX ORDER — AMLODIPINE AND VALSARTAN 5; 320 MG/1; MG/1
1 TABLET ORAL
Qty: 90 TABLET | Refills: 1 | Status: SHIPPED | OUTPATIENT
Start: 2025-05-29

## 2025-05-29 RX ORDER — INSULIN GLARGINE 100 [IU]/ML
INJECTION, SOLUTION SUBCUTANEOUS
Qty: 30 ML | Refills: 3 | Status: SHIPPED | OUTPATIENT
Start: 2025-05-29

## 2025-05-29 RX ORDER — METFORMIN HYDROCHLORIDE 500 MG/1
1000 TABLET, EXTENDED RELEASE ORAL DAILY
Qty: 180 TABLET | Refills: 1 | Status: SHIPPED | OUTPATIENT
Start: 2025-05-29

## 2025-05-29 RX ORDER — PIOGLITAZONE 15 MG/1
15 TABLET ORAL DAILY
Qty: 90 TABLET | Refills: 1 | Status: SHIPPED | OUTPATIENT
Start: 2025-05-29

## 2025-05-29 RX ORDER — TIRZEPATIDE 15 MG/.5ML
15 INJECTION, SOLUTION SUBCUTANEOUS
Qty: 2 ML | Refills: 5 | Status: CANCELLED | OUTPATIENT
Start: 2025-05-29

## 2025-05-29 RX ORDER — TIRZEPATIDE 15 MG/.5ML
15 INJECTION, SOLUTION SUBCUTANEOUS
Qty: 6 ML | Refills: 3 | Status: SHIPPED | OUTPATIENT
Start: 2025-05-29

## 2025-05-29 NOTE — PATIENT INSTRUCTIONS
Your A1c was 7% today - great work!!     Continue all meds as ordered.   -Refills for all your pills and pen needles have been sent to [facility].     Felicia will get your application submitted for the  Patient Assistance Program today to get your Mounjaro and Lantus for free.   -You should get a call from [facility] next week to notify you of approval and set up delivery.     Complete fasting blood work before next visit.     Follow up with Dr. Pitts in 6 months.

## 2025-05-29 NOTE — PROGRESS NOTES
"[facility] [place] [facility]      Mckinley Meade a 69 y.o. patient was referred to the Clinical Pharmacy Team for diabetes management. He presents today via telephone for initial assessment and management.      Referred by Dr. Joe Pitts       Diabetes:   A1c-7%.  Testing sugars 1-3 times per day, no low sugars, am sugars mid 100's, middle of day low/mid 100's.  Pt is exercising on a regular basis.     Taking mounjaro 15mg weekly, metformin XR 500mg - 2 daily, pioglitazone 15mg daily, and Lantus 21 units at bedtime (down from 64u).  -did not tolerate jardiance  -had tried ozempic in the past     -had problems keeping tc on     Primary Prevention:   Taking amlodipine 5mg/valsartan 320mg every day.  Taking pravastatin 40mg at bedtime  -seeing cardiology ([facility])    Current Medications[1]   Medical History[2]   RX Allergies[3]     Lab Results   Component Value Date    HGBA1C 7 (A) 05/29/2025       Lab Results   Component Value Date    BILITOT 0.3 05/10/2023    CALCIUM 9.3 05/10/2023    CO2 22 (L) 05/10/2023     05/10/2023    CREATININE 0.9 05/10/2023    GLUCOSE 146 (H) 05/10/2023    ALKPHOS 100 05/10/2023    K 4.7 05/10/2023    PROT 7.2 05/10/2023     05/10/2023    AST 38 05/10/2023    ALT 38 05/10/2023    BUN 23 05/10/2023    ANIONGAP 14 05/10/2023    ALBUMIN 4.0 05/10/2023    EGFR 94 05/10/2023       Lab Results   Component Value Date    WBC 6.3 05/10/2023    RBC 4.50 05/10/2023    HGB 13.2 (L) 05/10/2023    HCT 41.7 05/10/2023    MCV 92.7 05/10/2023    MCH 29.3 05/10/2023    MCHC 31.7 05/10/2023     05/10/2023       Lab Results   Component Value Date    CHOL 162 05/10/2023    TRIG 130 05/10/2023    HDL 32 (L) 05/10/2023    LDLCALC 130 05/10/2023       No results found for: \"TSH\", \"FREET4\", \"Z3XJSTC\"    Lab Results   Component Value Date    CREATU 103.0 02/11/2019    MICROALBCREA 11.7 02/11/2019        Patient Assistance Screening (VAF)  Patient verbally reports monthly or yearly income " which is less than 400% federal poverty level.  Application for program has been submitted for the following medications: Mounjaro and Lantus  Patient has been informed that program team will be reaching out to them to discuss necessary documentation, instructed to answer phone/return voicemail.   Patient aware this process may take up to 6 weeks.   If approved medication must be filled through [facility] pharmacy and may be picked up or mailed to patient.    PATIENT EDUCATION/DISCUSSION:  - Counseled patient on MOA, expectations, side effects, duration of therapy, contraindications, administration, and monitoring parameters  - Answered all patient questions and concerns    Assessment/Plan   1. Type 2 diabetes mellitus without complication, without long-term current use of insulin  - Referral to Clinical Pharmacy    Type 2 diabetes well controlled on current regimen.   -Benefits from Mounjaro to maximize glycemic control, weight loss, and cardio / renal protective benefits.   -Continue current regimen - lower lantus by 2u q 3 days as needed to keep am sugars 100 to 130.     Continue Mounjaro 15mg weekly.   Continue Lantus 21 units daily - adjust +/- 2u q 3 days for am sugars 100 to 130.   Apply for [facility] for cost assistance.   Prescription(s) sent to [facility] pharmacy for assistance on authorization and copay. Medication will be mailed to patient.  Continue all meds under the continuation of care with the referring provider and clinical pharmacy team.    FOLLOW UP:  As scheduled with Dr. Pitts and team in the endo office.   Annually with clinical pharmacist for re-enrollment in [facility], or as needed for medication / dose adjustments.      Felicia Luu, PharmD      Verbal consent to manage patient's drug therapy was obtained from the patient and/or an individual authorized to act on behalf of a patient. They were informed they may decline to participate or withdraw from participation in pharmacy services at  "any time.      ADDENDUM 5/30/25:   -Lantus is not covered by insurance - prefers insulin degludec.   -New rx sent to [facility] for  PAP.       [1]   Current Outpatient Medications:     allopurinol (Zyloprim) 100 mg tablet, Take 1 tablet (100 mg) by mouth once daily., Disp: 90 tablet, Rfl: 1    amlodipine-valsartan (Exforge) 5-320 mg tablet, Take 1 tablet by mouth in the morning., Disp: 90 tablet, Rfl: 1    BD Ultra-Fine Short Pen Needle 31 gauge x 5/16\" needle, Use with insulin pens daily, Disp: 100 each, Rfl: 1    insulin glargine (Lantus) 100 unit/mL (3 mL) pen, 64 units dailyTake as directed per insulin instructions., Disp: 60 mL, Rfl: 1    metFORMIN  mg 24 hr tablet, Take 2 tablets (1,000 mg) by mouth once daily., Disp: 180 tablet, Rfl: 1    pioglitazone (Actos) 15 mg tablet, Take 1 tablet (15 mg) by mouth once daily., Disp: 90 tablet, Rfl: 1    pravastatin (Pravachol) 40 mg tablet, Take 1 tablet (40 mg) by mouth once daily., Disp: 90 tablet, Rfl: 1    tamsulosin (Flomax) 0.4 mg 24 hr capsule, take two capsules by mouth daily at bedtime, Disp: , Rfl:     tirzepatide (Mounjaro) 15 mg/0.5 mL pen injector, Inject 15 mg under the skin 1 (one) time per week., Disp: 2 mL, Rfl: 5  [2]   Past Medical History:  Diagnosis Date    Personal history of other diseases of the circulatory system     History of hypertension    Personal history of other diseases of the nervous system and sense organs     History of obstructive sleep apnea   [3]   Allergies  Allergen Reactions    Empagliflozin Itching and Dry mouth     Thrush    Celecoxib GI intolerance, GI Upset, Nausea/vomiting, Nausea And Vomiting and Unknown    Ciprofloxacin Diarrhea and Rash     "

## 2025-05-29 NOTE — PROGRESS NOTES
Subjective   Patient ID: Mckinley Meade is a 69 y.o. male who presents for Diabetes.  HPI  70 yo presents as new pt for metabolic management.  Pt with dm2,(dx in 30's), htn, dyslipidemia, cvd (per cardiac cath), fatty liver disease, teena, highest wt 372 lbs, lowest wt 307 lbs on ozempic. A1c-8.4% today.    Testing sugars 1-3 times per day, no low sugars, am sugars mid 100's, middle of day low/mid 100's.  Pt is exercising on a regular basis.    Taking mounjaro 7.5mg weekly, metformin 1gram bid, toujeo 64 units at bedtime  -did not tolerate jardiance  -had tried ozempic in the past    -had problems keeping tc on    Taking amlodipine 5mg/valsartan 320mg every day.    -taking pravastatin 40mg at bedtime    -seeing cardiology ([facility])    [facility] labs:  11/24-A1c-10.4%  10/24 cr-1.09  5/23 ldl-130, A1c-7%  Current Medications[1]   RX Allergies[2]    Review of Systems  See HPI.     Objective   There were no vitals taken for this visit.    Labs:   Lab Results   Component Value Date    HGBA1C 7 (A) 05/29/2025     Lab Results   Component Value Date    CALCIUM 9.3 05/10/2023    AST 38 05/10/2023    ALKPHOS 100 05/10/2023    BILITOT 0.3 05/10/2023    PROT 7.2 05/10/2023    ALBUMIN 4.0 05/10/2023    GLOB 3.2 05/10/2023    AGR 1.3 (L) 05/10/2023     05/10/2023    K 4.7 05/10/2023     05/10/2023    CO2 22 (L) 05/10/2023    ANIONGAP 14 05/10/2023    BUN 23 05/10/2023    CREATININE 0.9 05/10/2023    UREACREAUR 25.6 (H) 05/10/2023    GLUCOSE 146 (H) 05/10/2023    ALT 38 05/10/2023    EGFR 94 05/10/2023     Lab Results   Component Value Date    WBC 6.3 05/10/2023    NRBC 0 05/10/2023    RBC 4.50 05/10/2023    HGB 13.2 (L) 05/10/2023    HCT 41.7 05/10/2023     05/10/2023     Lab Results   Component Value Date    CHOL 162 05/10/2023    TRIG 130 05/10/2023    HDL 32 (L) 05/10/2023    LDLCALC 130 05/10/2023     Lab Results   Component Value Date    CREATU 103.0 02/11/2019    MICROALBCREA 11.7 02/11/2019     No results  "found for: \"TSH\"  No results found for: \"QPBXHYRG08\"  No results found for: \"VITD25\"  No results found for: \"PTH\"  No results found for: \"MG\"    Assessment    1. Type 2 diabetes mellitus without complication, without long-term current use of insulin    2. Primary hypertension    3. Mixed hyperlipidemia        Medical Decision Making  Complexity of problem: Chronic illness of diabetes mellitus uncontrolled, progressing  Data analyzed and reviewed: Reviewed prior notes, blood glucose data, labs including HgbA1c, lipids, serum chemistries.  Ordered tests.   Risk of complications and morbidities: Is definite because of use of insulin and risk of hypoglycemia.  Prescription medications reviewed and modifications made.  Compliance assessed.  Addressed social determinants of health including food insecurity.    Plan   Problem List Items Addressed This Visit    None  Visit Diagnoses         Type 2 diabetes mellitus without complication, without long-term current use of insulin    -  Primary    Relevant Medications    allopurinol (Zyloprim) 100 mg tablet    amlodipine-valsartan (Exforge) 5-320 mg tablet    BD Ultra-Fine Short Pen Needle 31 gauge x 5/16\" needle    metFORMIN  mg 24 hr tablet    pioglitazone (Actos) 15 mg tablet    Other Relevant Orders    POCT glycosylated hemoglobin (Hb A1C) manually resulted (Completed)    Albumin-Creatinine Ratio, Urine Random    CBC and Auto Differential    Comprehensive Metabolic Panel    TSH with reflex to Free T4 if abnormal    Referral to Clinical Pharmacy      Primary hypertension          Mixed hyperlipidemia        Relevant Medications    pravastatin (Pravachol) 40 mg tablet    Other Relevant Orders    Lipid Panel            -labs/tests/notes reviewed  -reviewed and counseled patient on medication monitoring and side effects    Follow Up:    Treatment and plan discussed with Dr. Joe Pitts.   SHAILESH Luu, PharmD, BCACP, CDCES.        [1]   Current Outpatient Medications:     " "insulin glargine (Lantus) 100 unit/mL (3 mL) pen, 64 units dailyTake as directed per insulin instructions., Disp: 60 mL, Rfl: 1    tamsulosin (Flomax) 0.4 mg 24 hr capsule, take two capsules by mouth daily at bedtime, Disp: , Rfl:     tirzepatide (Mounjaro) 15 mg/0.5 mL pen injector, Inject 15 mg under the skin 1 (one) time per week., Disp: 2 mL, Rfl: 5    allopurinol (Zyloprim) 100 mg tablet, Take 1 tablet (100 mg) by mouth once daily., Disp: 90 tablet, Rfl: 1    amlodipine-valsartan (Exforge) 5-320 mg tablet, Take 1 tablet by mouth in the morning., Disp: 90 tablet, Rfl: 1    BD Ultra-Fine Short Pen Needle 31 gauge x 5/16\" needle, Use with insulin pens daily, Disp: 100 each, Rfl: 1    metFORMIN  mg 24 hr tablet, Take 2 tablets (1,000 mg) by mouth once daily., Disp: 180 tablet, Rfl: 1    pioglitazone (Actos) 15 mg tablet, Take 1 tablet (15 mg) by mouth once daily., Disp: 90 tablet, Rfl: 1    pravastatin (Pravachol) 40 mg tablet, Take 1 tablet (40 mg) by mouth once daily., Disp: 90 tablet, Rfl: 1  [2]   Allergies  Allergen Reactions    Empagliflozin Itching and Dry mouth     Thrush    Celecoxib GI intolerance, GI Upset, Nausea/vomiting, Nausea And Vomiting and Unknown    Ciprofloxacin Diarrhea and Rash     " 1  -On statin and tolerating.   -Continue current therapy.   -Recheck lipid panel before next visit - order placed.       -labs/tests/notes reviewed  -reviewed and counseled patient on medication monitoring and side effects    Follow Up: 6 months 30 minutes Dr. Pitts     Treatment and plan discussed with Dr. Joe Pitts.   SHAILESH Luu, PharmD, BCACP, CDCES.

## 2025-05-30 RX ORDER — INSULIN DEGLUDEC 100 U/ML
INJECTION, SOLUTION SUBCUTANEOUS
Qty: 30 ML | Refills: 3 | Status: SHIPPED | OUTPATIENT
Start: 2025-05-30

## 2025-06-04 VITALS
BODY MASS INDEX: 40.43 KG/M2 | DIASTOLIC BLOOD PRESSURE: 83 MMHG | HEIGHT: 74 IN | HEART RATE: 70 BPM | WEIGHT: 315 LBS | SYSTOLIC BLOOD PRESSURE: 140 MMHG

## 2025-06-04 NOTE — PROGRESS NOTES
Attestation signed by Joe Pitts MD on 6/4/25 at 4:33 PM.    I, Dr Joe Pitts, have reviewed this progress note, medication list, vital signs, any pertinent lab values, and any CGM data if present with the [facility] and  face to face during this visit today. This note reflects the treatment plan that was made under my direction after reviewing the above mentioned elements while face to face with the patient and CDE.  I personally answered and addressed any questions and concerns the patient had during the visit today.  The CDE entered the data in this note under my direction and I personally reviewed it, signed any lab or medication orders that I instructed to be completed. I am the billing provider for this visit and the level of service was determined by my involvement in the Medical Decision Making Component of this visit while face to face with the patient.

## 2025-06-05 PROCEDURE — RXMED WILLOW AMBULATORY MEDICATION CHARGE

## 2025-06-06 ENCOUNTER — PHARMACY VISIT (OUTPATIENT)
Dept: PHARMACY | Facility: CLINIC | Age: 70
End: 2025-06-06
Payer: COMMERCIAL

## 2025-06-06 PROCEDURE — RXMED WILLOW AMBULATORY MEDICATION CHARGE

## 2025-06-09 ENCOUNTER — TELEPHONE (OUTPATIENT)
Facility: CLINIC | Age: 70
End: 2025-06-09
Payer: MEDICARE

## 2025-06-09 DIAGNOSIS — R53.83 OTHER FATIGUE: Primary | ICD-10-CM

## 2025-06-10 ENCOUNTER — PHARMACY VISIT (OUTPATIENT)
Dept: PHARMACY | Facility: CLINIC | Age: 70
End: 2025-06-10
Payer: COMMERCIAL

## 2025-06-17 LAB
ALBUMIN SERPL-MCNC: 4.3 G/DL (ref 3.6–5.1)
ALBUMIN/CREAT UR: 5 MG/G CREAT
ALP SERPL-CCNC: 107 U/L (ref 35–144)
ALT SERPL-CCNC: 27 U/L (ref 9–46)
ANION GAP SERPL CALCULATED.4IONS-SCNC: 11 MMOL/L (CALC) (ref 7–17)
AST SERPL-CCNC: 24 U/L (ref 10–35)
BASOPHILS # BLD AUTO: 39 CELLS/UL (ref 0–200)
BASOPHILS NFR BLD AUTO: 0.6 %
BILIRUB SERPL-MCNC: 0.6 MG/DL (ref 0.2–1.2)
BUN SERPL-MCNC: 47 MG/DL (ref 7–25)
CALCIUM SERPL-MCNC: 8.8 MG/DL (ref 8.6–10.3)
CHLORIDE SERPL-SCNC: 106 MMOL/L (ref 98–110)
CHOLEST SERPL-MCNC: 173 MG/DL
CHOLEST/HDLC SERPL: 4.9 (CALC)
CO2 SERPL-SCNC: 20 MMOL/L (ref 20–32)
CREAT SERPL-MCNC: 1.19 MG/DL (ref 0.7–1.35)
CREAT UR-MCNC: 73 MG/DL (ref 20–320)
EGFRCR SERPLBLD CKD-EPI 2021: 66 ML/MIN/1.73M2
EOSINOPHIL # BLD AUTO: 117 CELLS/UL (ref 15–500)
EOSINOPHIL NFR BLD AUTO: 1.8 %
ERYTHROCYTE [DISTWIDTH] IN BLOOD BY AUTOMATED COUNT: 13.2 % (ref 11–15)
GLUCOSE SERPL-MCNC: 150 MG/DL (ref 65–99)
HCT VFR BLD AUTO: 43.7 % (ref 38.5–50)
HDLC SERPL-MCNC: 35 MG/DL
HGB BLD-MCNC: 14.1 G/DL (ref 13.2–17.1)
LDLC SERPL CALC-MCNC: 105 MG/DL (CALC)
LYMPHOCYTES # BLD AUTO: 1976 CELLS/UL (ref 850–3900)
LYMPHOCYTES NFR BLD AUTO: 30.4 %
MCH RBC QN AUTO: 28.5 PG (ref 27–33)
MCHC RBC AUTO-ENTMCNC: 32.3 G/DL (ref 32–36)
MCV RBC AUTO: 88.5 FL (ref 80–100)
MICROALBUMIN UR-MCNC: 0.4 MG/DL
MONOCYTES # BLD AUTO: 540 CELLS/UL (ref 200–950)
MONOCYTES NFR BLD AUTO: 8.3 %
NEUTROPHILS # BLD AUTO: 3829 CELLS/UL (ref 1500–7800)
NEUTROPHILS NFR BLD AUTO: 58.9 %
NONHDLC SERPL-MCNC: 138 MG/DL (CALC)
PLATELET # BLD AUTO: 186 THOUSAND/UL (ref 140–400)
PMV BLD REES-ECKER: 10 FL (ref 7.5–12.5)
POTASSIUM SERPL-SCNC: 4.5 MMOL/L (ref 3.5–5.3)
PROT SERPL-MCNC: 6.8 G/DL (ref 6.1–8.1)
RBC # BLD AUTO: 4.94 MILLION/UL (ref 4.2–5.8)
SODIUM SERPL-SCNC: 137 MMOL/L (ref 135–146)
TRIGL SERPL-MCNC: 210 MG/DL
TSH SERPL-ACNC: 2.49 MIU/L (ref 0.4–4.5)
WBC # BLD AUTO: 6.5 THOUSAND/UL (ref 3.8–10.8)

## 2025-06-21 LAB
TESTOST FREE SERPL-MCNC: 33.7 PG/ML (ref 35–155)
TESTOST SERPL-MCNC: 184 NG/DL (ref 250–1100)

## 2025-06-23 DIAGNOSIS — E29.1 MALE HYPOGONADISM: Primary | ICD-10-CM

## 2025-06-24 ENCOUNTER — OFFICE VISIT (OUTPATIENT)
Facility: CLINIC | Age: 70
End: 2025-06-24
Payer: MEDICARE

## 2025-06-24 VITALS
BODY MASS INDEX: 42.37 KG/M2 | DIASTOLIC BLOOD PRESSURE: 72 MMHG | WEIGHT: 315 LBS | SYSTOLIC BLOOD PRESSURE: 120 MMHG | HEART RATE: 83 BPM

## 2025-06-24 DIAGNOSIS — E78.2 MIXED HYPERLIPIDEMIA: ICD-10-CM

## 2025-06-24 DIAGNOSIS — E11.9 TYPE 2 DIABETES MELLITUS WITHOUT COMPLICATION, WITHOUT LONG-TERM CURRENT USE OF INSULIN: ICD-10-CM

## 2025-06-24 DIAGNOSIS — I10 PRIMARY HYPERTENSION: ICD-10-CM

## 2025-06-24 PROCEDURE — 3078F DIAST BP <80 MM HG: CPT | Performed by: INTERNAL MEDICINE

## 2025-06-24 PROCEDURE — 1126F AMNT PAIN NOTED NONE PRSNT: CPT | Performed by: INTERNAL MEDICINE

## 2025-06-24 PROCEDURE — 3074F SYST BP LT 130 MM HG: CPT | Performed by: INTERNAL MEDICINE

## 2025-06-24 PROCEDURE — 99213 OFFICE O/P EST LOW 20 MIN: CPT | Performed by: INTERNAL MEDICINE

## 2025-06-24 PROCEDURE — 3051F HG A1C>EQUAL 7.0%<8.0%: CPT | Performed by: INTERNAL MEDICINE

## 2025-06-24 PROCEDURE — 1159F MED LIST DOCD IN RCRD: CPT | Performed by: INTERNAL MEDICINE

## 2025-06-24 PROCEDURE — 1036F TOBACCO NON-USER: CPT | Performed by: INTERNAL MEDICINE

## 2025-06-24 ASSESSMENT — ENCOUNTER SYMPTOMS
OCCASIONAL FEELINGS OF UNSTEADINESS: 0
DEPRESSION: 0
LOSS OF SENSATION IN FEET: 0

## 2025-06-24 ASSESSMENT — PAIN SCALES - GENERAL: PAINLEVEL_OUTOF10: 0-NO PAIN

## 2025-06-24 NOTE — PROGRESS NOTES
"HPI   70 yo with dm2,(dx in 30's), htn, dyslipidemia, cvd (per cardiac cath), fatty liver disease, teena (cpap), highest wt 372 lbs, lowest wt 307 lbs on ozempic, presents for follow up.       Taking mounjaro 15mg weekly, gene 15mg daily. metformin  XR 500mg - 2 daily, Lantus 21 units (was 64u) at bedtime.  -did not tolerate jardiance  -had tried ozempic in the past     -had problems keeping tc on     Taking amlodipine 5mg/valsartan 320mg every day for htn.     Taking pravastatin 40mg at bedtime for lipids and tolerating.      -seeing cardiology ([facility])    Today:  -low testosterone on screening labs  -tried topical gel without result with pcp in 2024  -went to [facility] in 2020 range for depoT and felt much better on it  -in 2024 pt had full urologic workup/exams    -currently decreased libido, decreased energy, decreased muscular strength  -exercising with cardio/strength on a regular basis      Current Outpatient Medications:     allopurinol (Zyloprim) 100 mg tablet, Take 1 tablet (100 mg) by mouth once daily., Disp: 90 tablet, Rfl: 1    amlodipine-valsartan (Exforge) 5-320 mg tablet, Take 1 tablet by mouth in the morning., Disp: 90 tablet, Rfl: 1    BD Ultra-Fine Short Pen Needle 31 gauge x 5/16\" needle, Use with insulin pens daily, Disp: 100 each, Rfl: 1    insulin degludec (Tresiba FlexTouch U-100) 100 unit/mL (3 mL) pen, Take once daily as directed per insulin instructions, up to 30 units a day. *replaces Lantus*, Disp: 30 mL, Rfl: 3    insulin glargine (Lantus) 100 unit/mL (3 mL) pen, Take once daily as directed, up to 30 units a day., Disp: 30 mL, Rfl: 3    metFORMIN  mg 24 hr tablet, Take 2 tablets (1,000 mg) by mouth once daily., Disp: 180 tablet, Rfl: 1    pioglitazone (Actos) 15 mg tablet, Take 1 tablet (15 mg) by mouth once daily., Disp: 90 tablet, Rfl: 1    pravastatin (Pravachol) 40 mg tablet, Take 1 tablet (40 mg) by mouth once daily., Disp: 90 tablet, Rfl: 1    tamsulosin (Flomax) " 0.4 mg 24 hr capsule, take two capsules by mouth daily at bedtime, Disp: , Rfl:     tirzepatide (Mounjaro) 15 mg/0.5 mL pen injector, Inject 15 mg under the skin every 7 days., Disp: 6 mL, Rfl: 3      Allergies as of 06/24/2025 - Reviewed 06/24/2025   Allergen Reaction Noted    Empagliflozin Itching and Dry mouth 10/07/2024    Celecoxib GI intolerance, GI Upset, Nausea And Vomiting, Nausea/vomiting, and Unknown 10/24/2011    Ciprofloxacin Diarrhea and Rash 03/27/2018         Review of Systems   Cardiology: Lightheadedness-denies.  Chest pain-denies.  Leg edema-denies.  Palpitations-denies.  Respiratory: Cough-denies. Shortness of breath-denies.  Wheezing-denies.  Gastroenterology: Constipation-denies.  Diarrhea-denies.  Heartburn-denies.  Endocrinology: Cold intolerance-denies.  Heat intolerance-denies.  Sweats-denies.  Neurology: Headache-denies.  Tremor-denies.  Neuropathy in extremities-denies.  Psychology: Low energy-denies.  Irritability-denies.  Sleep disturbances-denies.      /72 (BP Location: Right arm, Patient Position: Sitting, BP Cuff Size: Adult)   Pulse 83   Wt 150 kg (330 lb) Comment: PER PT  BMI 42.37 kg/m²       Labs:  Lab Results   Component Value Date    WBC 6.5 06/16/2025    NRBC 0 05/10/2023    RBC 4.94 06/16/2025    HGB 14.1 06/16/2025    HCT 43.7 06/16/2025     06/16/2025     Lab Results   Component Value Date    CALCIUM 8.8 06/16/2025    AST 24 06/16/2025    ALKPHOS 107 06/16/2025    BILITOT 0.6 06/16/2025    PROT 6.8 06/16/2025    ALBUMIN 4.3 06/16/2025    GLOB 3.2 05/10/2023    AGR 1.3 (L) 05/10/2023     06/16/2025    K 4.5 06/16/2025     06/16/2025    CO2 20 06/16/2025    ANIONGAP 11 06/16/2025    BUN 47 (H) 06/16/2025    CREATININE 1.19 06/16/2025    UREACREAUR 25.6 (H) 05/10/2023    GLUCOSE 150 (H) 06/16/2025    ALT 27 06/16/2025    EGFR 66 06/16/2025     Lab Results   Component Value Date    CHOL 173 06/16/2025    TRIG 210 (H) 06/16/2025    HDL 35 (L)  "06/16/2025    LDLCALC 105 (H) 06/16/2025     Lab Results   Component Value Date    MICROALBCREA 5 06/16/2025     Lab Results   Component Value Date    TSH 2.49 06/16/2025     No results found for: \"ZWZPLAPP26\"  Lab Results   Component Value Date    HGBA1C 7 (A) 05/29/2025         Physical Exam   General Appearance: pleasant, cooperative, no acute distress  HEENT: no chemosis, no proptosis, no lid lag, no lid retraction  Neck: no lymphadenopathy, no thyromegaly, no dominant thyroid nodules  Heart: no murmurs, regular rate and rhythm, S1 and S2  Lungs: no wheezes, no rhonci, no rales  Extremities: no lower extremity swelling      Assessment/Plan   Low testosterone  -oarrs report run   -yearly testosterone consent signed    -check fsh/lh/free and total T/prolactin    -if testosterone still low can send for depoT q 2weeks, option for 1/2 dose weekly  -would repeat labs within 3 months      Follow Up:  -keep existing fall 2025 visit    -labs/tests/notes reviewed  -reviewed and counseled patient on medication monitoring and side effects          "

## 2025-06-29 LAB
FSH SERPL-ACNC: 11.3 MIU/ML (ref 1.4–12.8)
LH SERPL-ACNC: 8.4 MIU/ML (ref 1.6–15.2)
PROLACTIN SERPL-MCNC: 9.7 NG/ML (ref 2–18)
SHBG SERPL-SCNC: 27 NMOL/L (ref 22–77)
TESTOST FREE SERPL-MCNC: 30.3 PG/ML (ref 35–155)
TESTOST SERPL-MCNC: 177 NG/DL (ref 250–1100)

## 2025-06-30 ENCOUNTER — TELEPHONE (OUTPATIENT)
Facility: CLINIC | Age: 70
End: 2025-06-30
Payer: MEDICARE

## 2025-06-30 DIAGNOSIS — E29.1 MALE HYPOGONADISM: Primary | ICD-10-CM

## 2025-06-30 DIAGNOSIS — E29.1 MALE HYPOGONADISM: ICD-10-CM

## 2025-06-30 RX ORDER — NEEDLES, DISPOSABLE 27GX0.375"
NEEDLE, DISPOSABLE MISCELLANEOUS
Qty: 12 EACH | Refills: 3 | Status: SHIPPED | OUTPATIENT
Start: 2025-06-30

## 2025-06-30 RX ORDER — TESTOSTERONE CYPIONATE 200 MG/ML
200 INJECTION, SOLUTION INTRAMUSCULAR
Qty: 2 ML | Refills: 2 | Status: SHIPPED | OUTPATIENT
Start: 2025-06-30 | End: 2025-06-30 | Stop reason: SDUPTHER

## 2025-06-30 RX ORDER — TESTOSTERONE CYPIONATE 200 MG/ML
200 INJECTION, SOLUTION INTRAMUSCULAR
Qty: 6 ML | Refills: 0 | Status: SHIPPED | OUTPATIENT
Start: 2025-06-30 | End: 2025-09-28

## 2025-06-30 NOTE — TELEPHONE ENCOUNTER
Patient calling about his Testosterone,the pharmacy said they did not receive it Dr. Pitts did send it this morning but the transmission failed  can you please re send

## 2025-07-28 DIAGNOSIS — E11.9 TYPE 2 DIABETES MELLITUS WITHOUT COMPLICATION, WITHOUT LONG-TERM CURRENT USE OF INSULIN: ICD-10-CM

## 2025-07-28 RX ORDER — PIOGLITAZONE 15 MG/1
15 TABLET ORAL DAILY
Qty: 90 TABLET | Refills: 1 | Status: SHIPPED | OUTPATIENT
Start: 2025-07-28

## 2025-08-26 DIAGNOSIS — E11.9 TYPE 2 DIABETES MELLITUS WITHOUT COMPLICATION, WITHOUT LONG-TERM CURRENT USE OF INSULIN: ICD-10-CM

## 2025-08-26 PROCEDURE — RXMED WILLOW AMBULATORY MEDICATION CHARGE

## 2025-08-26 RX ORDER — TIRZEPATIDE 15 MG/.5ML
15 INJECTION, SOLUTION SUBCUTANEOUS
Qty: 6 ML | Refills: 3 | Status: SHIPPED | OUTPATIENT
Start: 2025-08-26

## 2025-08-28 ENCOUNTER — TELEPHONE (OUTPATIENT)
Facility: CLINIC | Age: 70
End: 2025-08-28
Payer: MEDICARE

## 2025-08-28 DIAGNOSIS — E29.1 MALE HYPOGONADISM: ICD-10-CM

## 2025-08-29 RX ORDER — TESTOSTERONE CYPIONATE 200 MG/ML
200 INJECTION, SOLUTION INTRAMUSCULAR
Qty: 6 ML | Refills: 1 | Status: SHIPPED | OUTPATIENT
Start: 2025-08-29 | End: 2025-11-27

## 2025-09-02 ENCOUNTER — PHARMACY VISIT (OUTPATIENT)
Dept: PHARMACY | Facility: CLINIC | Age: 70
End: 2025-09-02
Payer: COMMERCIAL

## 2025-11-25 ENCOUNTER — APPOINTMENT (OUTPATIENT)
Facility: CLINIC | Age: 70
End: 2025-11-25
Payer: MEDICARE